# Patient Record
Sex: FEMALE | Race: AMERICAN INDIAN OR ALASKA NATIVE | NOT HISPANIC OR LATINO | Employment: FULL TIME | URBAN - METROPOLITAN AREA
[De-identification: names, ages, dates, MRNs, and addresses within clinical notes are randomized per-mention and may not be internally consistent; named-entity substitution may affect disease eponyms.]

---
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
## 2020-01-07 ENCOUNTER — TRANSFERRED RECORDS (OUTPATIENT)
Dept: HEALTH INFORMATION MANAGEMENT | Facility: CLINIC | Age: 20
End: 2020-01-07

## 2020-05-18 ENCOUNTER — TELEPHONE (OUTPATIENT)
Dept: FAMILY MEDICINE | Facility: OTHER | Age: 20
End: 2020-05-18

## 2020-05-18 NOTE — TELEPHONE ENCOUNTER
Patient is due for a PHQ-9.  Index start date:1/25/2020  Index end date:5/24/2020    Please call patient. Pt is also due for a phone visit/E-visit for a med recheck.

## 2020-05-20 ENCOUNTER — TELEPHONE (OUTPATIENT)
Dept: FAMILY MEDICINE | Facility: OTHER | Age: 20
End: 2020-05-20

## 2020-05-20 ASSESSMENT — PATIENT HEALTH QUESTIONNAIRE - PHQ9: SUM OF ALL RESPONSES TO PHQ QUESTIONS 1-9: 0

## 2020-05-20 NOTE — TELEPHONE ENCOUNTER
Tried contacting patient. Left message to call back. Please relay information upon return.   Kika Santa CMA

## 2020-05-20 NOTE — TELEPHONE ENCOUNTER
Pt saw Dr. Thapa in October 2019 for hot flashes. Pt states she works at [employer] and they are requiring them to wear masks unless they have a doctor's note. She is requesting a note that she cannot wear a mask. She states she gets hot flashes with it on and can't focus on her job. Please call pt and she will  the letter. Maria Elena Garrett CMA ([facility])

## 2020-05-20 NOTE — TELEPHONE ENCOUNTER
Pt completed PHQ-9.    PHQ 5/20/2020   PHQ-9 Total Score 0   Q9: Thoughts of better off dead/self-harm past 2 weeks Not at all     Marai Elena Garrett CMA ([facility])

## 2020-05-20 NOTE — TELEPHONE ENCOUNTER
It is not appropriate for me to excuse her for not wearing mask for public safety, especially with her job setting.  She is too young to have hot flashes, need to be evaluate for it.  Thanks.

## 2020-05-21 NOTE — TELEPHONE ENCOUNTER
Patient was informed that It is not appropriate for Dr. Thapa to excuse you for not wearing mask for public safety, especially with your job setting.  You are too young to have hot flashes, need to be evaluate for it.    Eagle Arango, CMA

## 2021-10-19 PROBLEM — F32.9 MAJOR DEPRESSION: Status: ACTIVE | Noted: 2018-08-22
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
## 2023-06-20 ENCOUNTER — OFFICE VISIT (OUTPATIENT)
Dept: FAMILY MEDICINE | Facility: CLINIC | Age: 23
End: 2023-06-20
Payer: COMMERCIAL

## 2023-06-20 VITALS
OXYGEN SATURATION: 98 % | DIASTOLIC BLOOD PRESSURE: 71 MMHG | HEIGHT: 67 IN | HEART RATE: 103 BPM | RESPIRATION RATE: 10 BRPM | SYSTOLIC BLOOD PRESSURE: 108 MMHG | BODY MASS INDEX: 22.73 KG/M2 | WEIGHT: 144.8 LBS
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
## 2023-06-20 DIAGNOSIS — M54.50 CHRONIC BILATERAL LOW BACK PAIN WITHOUT SCIATICA: ICD-10-CM

## 2023-06-20 DIAGNOSIS — G89.29 CHRONIC BILATERAL LOW BACK PAIN WITHOUT SCIATICA: ICD-10-CM

## 2023-06-20 DIAGNOSIS — M70.61 TROCHANTERIC BURSITIS OF RIGHT HIP: ICD-10-CM

## 2023-06-20 DIAGNOSIS — M76.31 IT BAND SYNDROME, RIGHT: ICD-10-CM

## 2023-06-20 PROCEDURE — 99203 OFFICE O/P NEW LOW 30 MIN: CPT | Performed by: FAMILY MEDICINE

## 2023-06-20 ASSESSMENT — PAIN SCALES - GENERAL: PAINLEVEL: MODERATE PAIN (5)

## 2023-06-20 ASSESSMENT — PATIENT HEALTH QUESTIONNAIRE - PHQ9
SUM OF ALL RESPONSES TO PHQ QUESTIONS 1-9: 0
10. IF YOU CHECKED OFF ANY PROBLEMS, HOW DIFFICULT HAVE THESE PROBLEMS MADE IT FOR YOU TO DO YOUR WORK, TAKE CARE OF THINGS AT HOME, OR GET ALONG WITH OTHER PEOPLE: NOT DIFFICULT AT ALL
SUM OF ALL RESPONSES TO PHQ QUESTIONS 1-9: 0

## 2023-06-20 ASSESSMENT — ANXIETY QUESTIONNAIRES
GAD7 TOTAL SCORE: 0
5. BEING SO RESTLESS THAT IT IS HARD TO SIT STILL: NOT AT ALL
GAD7 TOTAL SCORE: 0
3. WORRYING TOO MUCH ABOUT DIFFERENT THINGS: NOT AT ALL
1. FEELING NERVOUS, ANXIOUS, OR ON EDGE: NOT AT ALL
7. FEELING AFRAID AS IF SOMETHING AWFUL MIGHT HAPPEN: NOT AT ALL
7. FEELING AFRAID AS IF SOMETHING AWFUL MIGHT HAPPEN: NOT AT ALL
2. NOT BEING ABLE TO STOP OR CONTROL WORRYING: NOT AT ALL
GAD7 TOTAL SCORE: 0
6. BECOMING EASILY ANNOYED OR IRRITABLE: NOT AT ALL
4. TROUBLE RELAXING: NOT AT ALL
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
## 2023-09-19 ENCOUNTER — TELEPHONE (OUTPATIENT)
Dept: FAMILY MEDICINE | Facility: CLINIC | Age: 23
End: 2023-09-19

## 2023-09-19 DIAGNOSIS — M70.61 TROCHANTERIC BURSITIS OF RIGHT HIP: ICD-10-CM
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
## 2023-09-19 DIAGNOSIS — M76.31 IT BAND SYNDROME, RIGHT: ICD-10-CM

## 2023-09-19 DIAGNOSIS — M54.50 CHRONIC BILATERAL LOW BACK PAIN WITHOUT SCIATICA: ICD-10-CM

## 2023-09-19 DIAGNOSIS — G89.29 CHRONIC BILATERAL LOW BACK PAIN WITHOUT SCIATICA: ICD-10-CM
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
## 2023-09-21 NOTE — TELEPHONE ENCOUNTER
Referrals for physical therapy and chiropractic have been faxed to [facility] as requested by patient. Argelia Thomas LPN

## 2023-09-21 NOTE — TELEPHONE ENCOUNTER
Orders signed.  Sending back to team to follow-up on notification and/or subsequent scheduling.    Bertin Caceres MD

## 2024-04-04 ENCOUNTER — TELEPHONE (OUTPATIENT)
Dept: FAMILY MEDICINE | Facility: CLINIC | Age: 24
End: 2024-04-04
Payer: COMMERCIAL

## 2024-04-04 NOTE — TELEPHONE ENCOUNTER
Reason for Call:  Appointment Request    Patient requesting this type of appt: Procedure: IUD replacement    Requested provider:  PCP    Reason patient unable to be scheduled: whenever open    When does patient want to be seen/preferred time:  ASAP    Comments: Pt will need sedation for this replacement    Okay to leave a detailed message?: Yes at Cell number on file:    Telephone Information:   Mobile [phone]

## 2024-04-05 NOTE — TELEPHONE ENCOUNTER
Mirena IUDs now can be left in place for 8 years for birth control.    Will have staff notify patient. She has appointment in August and we can discuss this more then.    Bertin Caceres MD

## 2024-08-27 ENCOUNTER — OFFICE VISIT (OUTPATIENT)
Dept: FAMILY MEDICINE | Facility: CLINIC | Age: 24
End: 2024-08-27
Payer: COMMERCIAL

## 2024-08-27 VITALS
SYSTOLIC BLOOD PRESSURE: 110 MMHG | WEIGHT: 144 LBS | TEMPERATURE: 97.1 F | HEART RATE: 82 BPM | HEIGHT: 68 IN | BODY MASS INDEX: 21.82 KG/M2 | DIASTOLIC BLOOD PRESSURE: 74 MMHG | RESPIRATION RATE: 16 BRPM

## 2024-08-27 DIAGNOSIS — F41.1 GAD (GENERALIZED ANXIETY DISORDER): ICD-10-CM

## 2024-08-27 DIAGNOSIS — Z11.59 NEED FOR HEPATITIS C SCREENING TEST: ICD-10-CM

## 2024-08-27 DIAGNOSIS — Z11.3 SCREEN FOR STD (SEXUALLY TRANSMITTED DISEASE): ICD-10-CM

## 2024-08-27 DIAGNOSIS — Z00.00 ROUTINE GENERAL MEDICAL EXAMINATION AT A HEALTH CARE FACILITY: Primary | ICD-10-CM

## 2024-08-27 DIAGNOSIS — N94.6 DYSMENORRHEA: ICD-10-CM

## 2024-08-27 PROBLEM — Z87.820 HX OF TRAUMATIC BRAIN INJURY: Status: RESOLVED | Noted: 2017-08-28 | Resolved: 2024-08-27

## 2024-08-27 PROBLEM — Z86.79 PERSONAL HISTORY OF SUBARACHNOID HEMORRHAGE: Status: RESOLVED | Noted: 2019-10-28 | Resolved: 2024-08-27

## 2024-08-27 PROBLEM — S06.9X9A TRAUMATIC BRAIN INJURY WITH LOSS OF CONSCIOUSNESS (H): Status: RESOLVED | Noted: 2019-10-28 | Resolved: 2024-08-27

## 2024-08-27 PROBLEM — Z97.5 IUD (INTRAUTERINE DEVICE) IN PLACE: Status: ACTIVE | Noted: 2019-04-04

## 2024-08-27 PROBLEM — F32.9 MAJOR DEPRESSION: Status: RESOLVED | Noted: 2018-08-22 | Resolved: 2024-08-27
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
## 2024-08-27 LAB — HCV AB SERPL QL IA: NONREACTIVE

## 2024-08-27 PROCEDURE — 86803 HEPATITIS C AB TEST: CPT | Performed by: FAMILY MEDICINE

## 2024-08-27 PROCEDURE — 87591 N.GONORRHOEAE DNA AMP PROB: CPT | Performed by: FAMILY MEDICINE

## 2024-08-27 PROCEDURE — 87491 CHLMYD TRACH DNA AMP PROBE: CPT | Performed by: FAMILY MEDICINE

## 2024-08-27 PROCEDURE — 36415 COLL VENOUS BLD VENIPUNCTURE: CPT | Performed by: FAMILY MEDICINE

## 2024-08-27 PROCEDURE — 99395 PREV VISIT EST AGE 18-39: CPT | Performed by: FAMILY MEDICINE

## 2024-08-27 ASSESSMENT — PATIENT HEALTH QUESTIONNAIRE - PHQ9
SUM OF ALL RESPONSES TO PHQ QUESTIONS 1-9: 1
10. IF YOU CHECKED OFF ANY PROBLEMS, HOW DIFFICULT HAVE THESE PROBLEMS MADE IT FOR YOU TO DO YOUR WORK, TAKE CARE OF THINGS AT HOME, OR GET ALONG WITH OTHER PEOPLE: NOT DIFFICULT AT ALL
SUM OF ALL RESPONSES TO PHQ QUESTIONS 1-9: 1

## 2024-08-27 ASSESSMENT — PAIN SCALES - GENERAL: PAINLEVEL: NO PAIN (0)

## 2024-08-27 NOTE — LETTER
September 20, 2024      Yamileth Nichols  [address] [place] [id number]        Dear ,    We are writing to inform you of your test results.    Your test results fall within the expected range(s) or remain unchanged from previous results.  Please continue with current treatment plan.    Resulted Orders   Hepatitis C Screen Reflex to HCV RNA Quant and Genotype   Result Value Ref Range    Hepatitis C Antibody Nonreactive Nonreactive      Comment:      A nonreactive screening test result does not exclude the possibility of exposure to or infection with HCV. Nonreactive screening test results in individuals with prior exposure to HCV may be due to antibody levels below the limit of detection of this assay or lack of reactivity to the HCV antigens used in this assay. Patients with recent HCV infections (<3 months from time of exposure) may have false-negative HCV antibody results due to the time needed for seroconversion (average of 8 to 9 weeks).   CHLAMYDIA TRACHOMATIS PCR   Result Value Ref Range    Chlamydia trachomatis Negative Negative      Comment:      A negative result by transcription mediated amplification does not preclude the presence of C. trachomatis infection because results are dependent on proper and adequate collection, absence of inhibitors and sufficient rRNA to be detected.   NEISSERIA GONORRHOEA PCR   Result Value Ref Range    Neisseria gonorrhoeae Negative Negative      Comment:      Negative for N. gonorrhoeae rRNA by transcription mediated amplification. A negative result by transcription mediated amplification does not preclude the presence of C. trachomatis infection because results are dependent on proper and adequate collection, absence of inhibitors and sufficient rRNA to be detected.       If you have any questions or concerns, please call the clinic at the number listed above.       Sincerely,      Bertin Caceres MD
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
## 2024-08-27 NOTE — PATIENT INSTRUCTIONS
Patient Education   Preventive Care Advice   This is general advice given by our system to help you stay healthy. However, your care team may have specific advice just for you. Please talk to your care team about your preventive care needs.  Nutrition  Eat 5 or more servings of fruits and vegetables each day.  Try wheat bread, brown rice and whole grain pasta (instead of white bread, rice, and pasta).  Get enough calcium and vitamin D. Check the label on foods and aim for 100% of the RDA (recommended daily allowance).  Lifestyle  Exercise at least 150 minutes each week  (30 minutes a day, 5 days a week).  Do muscle strengthening activities 2 days a week. These help control your weight and prevent disease.  No smoking.  Wear sunscreen to prevent skin cancer.  Have a dental exam and cleaning every 6 months.  Yearly exams  See your health care team every year to talk about:  Any changes in your health.  Any medicines your care team has prescribed.  Preventive care, family planning, and ways to prevent chronic diseases.  Shots (vaccines)   HPV shots (up to age 26), if you've never had them before.  Hepatitis B shots (up to age 59), if you've never had them before.  COVID-19 shot: Get this shot when it's due.  Flu shot: Get a flu shot every year.  Tetanus shot: Get a tetanus shot every 10 years.  Pneumococcal, hepatitis A, and RSV shots: Ask your care team if you need these based on your risk.  Shingles shot (for age 50 and up)  General health tests  Diabetes screening:  Starting at age 35, Get screened for diabetes at least every 3 years.  If you are younger than age 35, ask your care team if you should be screened for diabetes.  Cholesterol test: At age 39, start having a cholesterol test every 5 years, or more often if advised.  Bone density scan (DEXA): At age 50, ask your care team if you should have this scan for osteoporosis (brittle bones).  Hepatitis C: Get tested at least once in your life.  STIs (sexually  transmitted infections)  Before age 24: Ask your care team if you should be screened for STIs.  After age 24: Get screened for STIs if you're at risk. You are at risk for STIs (including HIV) if:  You are sexually active with more than one person.  You don't use condoms every time.  You or a partner was diagnosed with a sexually transmitted infection.  If you are at risk for HIV, ask about PrEP medicine to prevent HIV.  Get tested for HIV at least once in your life, whether you are at risk for HIV or not.  Cancer screening tests  Cervical cancer screening: If you have a cervix, begin getting regular cervical cancer screening tests starting at age 21.  Breast cancer scan (mammogram): If you've ever had breasts, begin having regular mammograms starting at age 40. This is a scan to check for breast cancer.  Colon cancer screening: It is important to start screening for colon cancer at age 45.  Have a colonoscopy test every 10 years (or more often if you're at risk) Or, ask your provider about stool tests like a FIT test every year or Cologuard test every 3 years.  To learn more about your testing options, visit:   .  For help making a decision, visit:   https://bit.ly/dv60961.  Prostate cancer screening test: If you have a prostate, ask your care team if a prostate cancer screening test (PSA) at age 55 is right for you.  Lung cancer screening: If you are a current or former smoker ages 50 to 80, ask your care team if ongoing lung cancer screenings are right for you.  For informational purposes only. Not to replace the advice of your health care provider. Copyright   2023 [employer]. All rights reserved. Clinically reviewed by the [facility] Transitions Program. [employer] 802238 - REV 01/24.

## 2024-08-28 LAB
C TRACH DNA SPEC QL NAA+PROBE: NEGATIVE
N GONORRHOEA DNA SPEC QL NAA+PROBE: NEGATIVE

## 2024-09-22 ENCOUNTER — TELEPHONE (OUTPATIENT)
Dept: FAMILY MEDICINE | Facility: CLINIC | Age: 24
End: 2024-09-22
Payer: COMMERCIAL

## 2024-09-22 NOTE — TELEPHONE ENCOUNTER
Please call patient and let her know that Dr. Thea Raygoza can do a telephone or video-based visit to discuss IUD placement with sedation/pain reduction methods.  After they do consultation, they can discuss timing of procedure and how to get it scheduled.    Bertin Caceres MD

## 2024-10-09 ENCOUNTER — VIRTUAL VISIT (OUTPATIENT)
Dept: FAMILY MEDICINE | Facility: CLINIC | Age: 24
End: 2024-10-09
Payer: COMMERCIAL

## 2024-10-09 ENCOUNTER — MYC MEDICAL ADVICE (OUTPATIENT)
Dept: FAMILY MEDICINE | Facility: CLINIC | Age: 24
End: 2024-10-09

## 2024-10-09 DIAGNOSIS — Z30.431 INTRAUTERINE DEVICE SURVEILLANCE: ICD-10-CM

## 2024-10-09 DIAGNOSIS — N94.6 DYSMENORRHEA: Primary | ICD-10-CM

## 2024-10-09 DIAGNOSIS — Z30.09 BIRTH CONTROL COUNSELING: ICD-10-CM

## 2024-10-09 PROCEDURE — 99214 OFFICE O/P EST MOD 30 MIN: CPT | Mod: 95 | Performed by: FAMILY MEDICINE

## 2024-10-09 NOTE — PROGRESS NOTES
Yamileth is a 23 year old who is being evaluated via a billable video visit.    How would you like to obtain your AVS? MyChart  If the video visit is dropped, the invitation should be resent by: Text to cell phone: [phone]  Will anyone else be joining your video visit? No      Assessment & Plan     (N94.6) Dysmenorrhea  (primary encounter diagnosis)  Comment: Patient with significant dysmenorrhea and heavy vaginal bleeding during her periods.  She is currently using IUD to help control her symptoms and for birth control.  Current device is 5 years old that she has noticed a significant increase in her vaginal bleeding and pain with her periods.  We discussed both pharmacologic and nonpharmacologic options for pain control and anxiety prevention during her procedure.  She will consider her options and message me back when she is ready to decide on the specifics that she would like to have.  She is amenable to scheduling the removal and replacement of her IUD for November    (Z30.431) Intrauterine device surveillance  Comment: Discussed Mirena IUD.  She is amenable to replacement of her current device in November    (Z30.09) Birth control counseling  Comment: Comprehensive birth control counseling was done with the patient.  At this juncture she would like to continue with her Mirena IUD.  She was offered but declined various other methods including Nexplanon as well as combined contraceptive use: Oral/topical/ring                Subjective   Yamileth is a 23 year old, presenting for the following health issues:  IUD        10/9/2024    10:52 AM   Additional Questions   Roomed by Jazmine Zavala Start Time:  1100    IUD    History of Present Illness       Reason for visit:  IUD consultation   She is taking medications regularly.     Is a 23-year-old female here for consult regarding IUD insertion.  She had a very difficult insertion on last IUD placement and was worried about replacing her current IUD.  She  currently uses Mirena IUD for control of heavy vaginal bleeding during her periods.  Her current device is approximately 5 years old but is lessening significantly in efficacy with significant uptake and bleeding over the last 6 months.  I was asked to consult as she had a very difficult placement when it was initially placed.  She had significant cramping and bleeding and then difficulties as the strings were cut too short and cause discomfort for her partner    Reviewed options for the actual procedure itself.  We can premedicate with Ativan as well as an opioid if she desires.  She does not desire an opioid at this time but we did discuss doing NSAID instead and patient was amenable to that.  We also discussed doing a paracervical block and patient was amenable to that.  She will be discussing her options with her mother as well.  We did discuss doing it in the procedure room here in clinic versus doing a procedure in the main OR with sedation.  She will consider her options        Objective    Vitals - Patient Reported  Pain Score: No Pain (0)        Physical Exam   GENERAL: alert and no distress  EYES: Eyes grossly normal to inspection.  No discharge or erythema, or obvious scleral/conjunctival abnormalities.  RESP: No audible wheeze, cough, or visible cyanosis.    SKIN: Visible skin clear. No significant rash, abnormal pigmentation or lesions.  NEURO: Cranial nerves grossly intact.  Mentation and speech appropriate for age.  PSYCH: Appropriate affect, tone, and pace of words    Office Visit on 08/27/2024   Component Date Value Ref Range Status    Hepatitis C Antibody 08/27/2024 Nonreactive  Nonreactive Final    A nonreactive screening test result does not exclude the possibility of exposure to or infection with HCV. Nonreactive screening test results in individuals with prior exposure to HCV may be due to antibody levels below the limit of detection of this assay or lack of reactivity to the HCV antigens used  in this assay. Patients with recent HCV infections (<3 months from time of exposure) may have false-negative HCV antibody results due to the time needed for seroconversion (average of 8 to 9 weeks).    Chlamydia trachomatis 08/27/2024 Negative  Negative Final    A negative result by transcription mediated amplification does not preclude the presence of C. trachomatis infection because results are dependent on proper and adequate collection, absence of inhibitors and sufficient rRNA to be detected.    Neisseria gonorrhoeae 08/27/2024 Negative  Negative Final    Negative for N. gonorrhoeae rRNA by transcription mediated amplification. A negative result by transcription mediated amplification does not preclude the presence of C. trachomatis infection because results are dependent on proper and adequate collection, absence of inhibitors and sufficient rRNA to be detected.         Video-Visit Details    Type of service:  Video Visit   Video End Time: 1130  Originating Location (pt. Location): Home    Distant Location (provider location):  On-site  Platform used for Video Visit: Tania  Signed Electronically by: Thea Raygoza MD

## 2024-10-09 NOTE — Clinical Note
Can we reach out to get this IUD placement scheduled in the procedure room? Pt requesting for November. Please note on appt that we will be doing a cervical block and Premedicate with ativan.

## 2024-10-11 ENCOUNTER — TELEPHONE (OUTPATIENT)
Dept: FAMILY MEDICINE | Facility: CLINIC | Age: 24
End: 2024-10-11
Payer: COMMERCIAL

## 2024-10-11 NOTE — TELEPHONE ENCOUNTER
Thea Raygoza MD  P [facility]      Can we reach out to get this IUD placement scheduled in the procedure room? Pt requesting for November. Please note on appt that we will be doing a cervical block and Premedicate with ativan.        [employer] message sent to patient to assist with scheduling. Reminder set up for 3 days to call patient if message is not read by then.

## 2024-10-14 ENCOUNTER — NURSE TRIAGE (OUTPATIENT)
Dept: FAMILY MEDICINE | Facility: CLINIC | Age: 24
End: 2024-10-14
Payer: COMMERCIAL

## 2024-10-14 NOTE — TELEPHONE ENCOUNTER
"Nurse Triage SBAR    Is this a 2nd Level Triage? YES, LICENSED PRACTITIONER REVIEW IS REQUIRED    Situation: Patient reports constant abdominal pain for the last week or so.     Background: Patient states she has an IUD that has been in place for 5 years now. She states her previous IUD was replaced at 5 years, but with this most recent one states she has been told it is good for 8 years. She states 4/4/24 was her 5 year clay. She states she typically would not get a period with her IUD, but since she got closer to the 5 year clay for her IUD she has experienced menstrual bleeding with no cycle, states it is random and can last for a day or even a couple hours.    Patient states she is in [place] right now, and will be returning home next week on Thursday or Friday.     Assessment: Patient states she had a virtual visit 10/9/24, and at that time had abdominal pain, but it was \"super faint\" so she did not mention it during the visit. She states it started a day or 2 prior, so her symptoms have been going on for a week now. She states the pain is on the right side of her abdomen, about 2\" from her belly button, and slightly lower. She states she has pain and a pressure sensation. She states her current pain level is 3-4/10, her pain has been constant, and the intensity varies depending on position. She states she has been having BM, does not feel constipated, but states if she has a BM it can relieve some of the pressure she feels in her abdomen. She states at times she gets \"electricity cramping\" in her abdomen that goes into her rectum.     Protocol Recommended Disposition:   Call ADS/Go to ED/UCC Now (Or To Office with PCP Approval)    Recommendation: Per protocol, patient was advised she should go to ED/UCC Now. She refused, states [place] of [place] now and does not have great insurance. She states if her pain gets worse she would go in to be seen, but otherwise would like to be seen in clinic after returning " home next week. Advised patient to call back or seek urgent/emergency care for any new or worsening symptoms. She verbalized understanding and had no other questions or concerns.       MSI Brewster, RN        Reason for Disposition   MILD TO MODERATE constant pain lasting > 2 hours    Additional Information   Negative: Passed out (i.e., fainted, collapsed and was not responding)   Negative: Shock suspected (e.g., cold/pale/clammy skin, too weak to stand, low BP, rapid pulse)   Negative: Sounds like a life-threatening emergency to the triager   Negative: Followed an abdomen (stomach) injury   Negative: Chest pain   Negative: Abdominal pain and pregnant < 20 weeks   Negative: Abdominal pain and pregnant 20 or more weeks   Negative: Pain is mainly in upper abdomen (if needed ask: 'is it mainly above the belly button?')   Negative: Abdomen bloating or swelling are main symptoms   Negative: SEVERE abdominal pain (e.g., excruciating)   Negative: Vomiting red blood or black (coffee ground) material   Negative: Blood in bowel movements  (Exception: Blood on surface of BM with constipation.)   Negative: Black or tarry bowel movements  (Exception: Chronic-unchanged black-grey BMs AND is taking iron pills or Pepto-Bismol.)    Protocols used: Abdominal Pain - Female-A-[place]

## 2024-10-14 NOTE — TELEPHONE ENCOUNTER
"I am not sure when she specifically is coming home, we could see her on 10/21 at 1 PM \"provider schedule time\"    If that does not work, we could also see her on Tuesday 10/29 at 1 PM \"provider schedule time\"    Will have staff notify patient and schedule.    Bertin Caceres MD  " breath sounds clear and equal bilaterally.

## 2024-10-14 NOTE — TELEPHONE ENCOUNTER
Patient notified of providers advice and she is unable to make it either day. Patient scheduled with another provider for a day that works for her.    Mai Bahena RN on 10/14/2024 at 1:11 PM

## 2024-10-15 NOTE — TELEPHONE ENCOUNTER
Patient has multiple myCharts and communications since MyChart. No further action needed.    Neal Damon RN on 10/15/2024 at 9:31 AM      Future Appointments 10/15/2024 - 4/13/2025        Date Visit Type Length Department Provider     10/28/2024  1:00 PM OFFICE VISIT 30 min  FAMILY PRACTICE Danica Jimenez NP    Location Instructions:     [facility] is located at [address], within [facility]. This is near the [address] exit off of [address]. Turn north off the exit, then west onto [address]. Park in the Blue Lot. The [facility] and [facility] share a main entrance and .               12/20/2024 11:00 AM IUD PLACEMENT OR REPLACEMENT 40 min  FAMILY PRACTICE Thea Raygoza MD     12/20/2024 11:00 AM IUD PLACEMENT OR REPLACEMENT 40 min  FAMILY PRACTICE [facility]    Location Instructions:     [facility] is located at [address], within [facility]. This is near the [address] exit off of [address]. Turn north off the exit, then west onto [address]. Park in the Blue Lot. The [facility] and [facility] share a main entrance and .

## 2024-10-28 ENCOUNTER — OFFICE VISIT (OUTPATIENT)
Dept: FAMILY MEDICINE | Facility: CLINIC | Age: 24
End: 2024-10-28
Payer: COMMERCIAL

## 2024-10-28 ENCOUNTER — MYC MEDICAL ADVICE (OUTPATIENT)
Dept: FAMILY MEDICINE | Facility: CLINIC | Age: 24
End: 2024-10-28

## 2024-10-28 VITALS
RESPIRATION RATE: 16 BRPM | HEIGHT: 69 IN | HEART RATE: 82 BPM | SYSTOLIC BLOOD PRESSURE: 108 MMHG | DIASTOLIC BLOOD PRESSURE: 72 MMHG | OXYGEN SATURATION: 98 % | TEMPERATURE: 98.5 F | WEIGHT: 149.2 LBS | BODY MASS INDEX: 22.1 KG/M2

## 2024-10-28 DIAGNOSIS — R10.31 RLQ ABDOMINAL PAIN: Primary | ICD-10-CM

## 2024-10-28 PROCEDURE — 99213 OFFICE O/P EST LOW 20 MIN: CPT

## 2024-10-28 ASSESSMENT — PAIN SCALES - GENERAL: PAINLEVEL_OUTOF10: NO PAIN (0)

## 2024-10-28 NOTE — PROGRESS NOTES
Assessment & Plan     RLQ abdominal pain  - US Abdomen Complete; Future        I spent a total of 10 minutes on the day of the visit.   Time spent by me doing chart review, history and exam, documentation and further activities per the note      See Patient Instructions  Patient Instructions   Assessment  - Differential diagnosis includes appendicitis, ovarian cyst rupture, or menstrual-related cramping.  - Previous history of a right ovarian cyst noted; possible rupture considered.  - Ultrasound recommended to rule out appendicitis and assess for ovarian cysts.    Plan  - Tylenol as needed  Schedule an abdominal ultrasound to evaluate for appendicitis or ovarian cyst.  - Monitor for worsening pain and return for evaluation if symptoms intensify.  - Review provided information on abdominal pain and follow guidelines for when to seek medical attention.  - Check [employer] account for ultrasound results once available.      Call US to schedule    Treatment for abdominal pain depends on the underlying cause and severity of the pain. Here s a general overview of approaches to managing abdominal pain:    ### 1. **Medical Evaluation and Diagnosis**    - **History and Physical Examination**: Detailed assessment to determine the location, duration, and characteristics of the pain.  - **Diagnostic Tests**: Depending on suspected causes, these may include blood tests, imaging (such as ultrasound, CT scan, or MRI), endoscopy, or other specialized tests.    ### 2. **Treatment Options**    #### **Based on Cause**    - **Gastrointestinal Conditions**: Treatment may include medications, dietary adjustments, and lifestyle changes.    - **Acid Reflux or GERD**: Antacids, H2 blockers (e.g., ranitidine), or proton pump inhibitors (e.g., omeprazole).    - **Peptic Ulcer Disease**: Antibiotics to eradicate H. pylori infection, acid-suppressing medications.    - **Inflammatory Bowel Disease**: Anti-inflammatory medications,  immune-modulating drugs, and dietary changes.    - **Gallstones**: Pain management, medications to dissolve stones, or surgical removal (cholecystectomy).    - **Pancreatitis**: Hospitalization, pain control, IV fluids, and management of underlying causes (e.g., alcohol cessation).    - **IBS (Irritable Bowel Syndrome)**: Dietary modifications, stress management, and medications to relieve symptoms.  - **Gynecological Conditions**: Treatment options may include medications, hormonal therapy, or surgical interventions for conditions like ovarian cysts, endometriosis, or fibroids.  - **Urinary Tract Conditions**: Antibiotics for urinary tract infections, pain management for kidney stones, or surgical interventions if needed.    #### **Pain Management**    - **Analgesics**: Over-the-counter pain relievers such as acetaminophen or NSAIDs (like ibuprofen) may help alleviate mild to moderate pain.  - **Prescription Pain Medications**: Stronger pain relievers may be prescribed for severe pain under medical supervision.  - **Antispasmodic Medications**: For conditions causing muscle spasms in the gastrointestinal tract.    ### 3. **Lifestyle and Home Remedies**    - **Dietary Changes**: Avoiding trigger foods, increasing fiber intake for constipation, and maintaining a balanced diet.  - **Hydration**: Drinking plenty of fluids to prevent dehydration and support digestive health.  - **Stress Management**: Techniques such as relaxation exercises or cognitive behavioral therapy may help manage stress-related abdominal pain.    ### 4. **Surgical Interventions**    - **Emergency Situations**: Surgery may be necessary for conditions like appendicitis, bowel obstruction, or perforated ulcers.    ### 5. **Follow-Up and Monitoring**    - **Regular Monitoring**: Especially important for chronic conditions or following surgical interventions to ensure healing and manage ongoing symptoms.  - **Medication Adjustments**: Monitoring for  side effects and adjusting medications as needed.    ### 6. **Specialist Consultation**    - **Referral**: Depending on the suspected cause of abdominal pain, consultation with a gastroenterologist, gynecologist, urologist, or other specialists may be necessary for specialized evaluation and management.    ### Summary    The treatment of abdominal pain is tailored to the underlying cause, which requires a thorough evaluation by a healthcare provider. Effective management often involves a combination of medications, lifestyle changes, and sometimes surgical interventions. Prompt medical attention is crucial, especially for severe or persistent abdominal pain, to diagnose the cause and initiate appropriate treatment to alleviate symptoms and prevent complications.     [place]    Patient understood and verbally consented to the treatment plan. Discussed symptoms that would warrant an urgent or emergent visit. All of the patients' questions were answered. Patient was instructed to contact the clinic if questions or concerns arise. Recommend follow up appointments if symptoms worsen or fail to improve. Recommend follow up as needed. Recommend ER in the case of an emergency.    Lina Schroeder PA-C    Please note: Voice recognition software may have been used in preparing this note, unintended word substitutions may be present.      Warren Carson is a 23 year old, presenting for the following health issues:  Abdominal Pain        10/28/2024     2:23 PM   Additional Questions   Roomed by Jesenia     History of Present Illness       Reason for visit:  Pain in abdomen  Symptom onset:  3-4 weeks ago  Symptom intensity:  Severe  Symptom progression:  Improving  Had these symptoms before:  No   She is taking medications regularly.     Abdominal pain, she almost went to the ER but was unsure if it would be covred. October 19th 1am to 5am. Sever abdominal pain. Right lower side. Started getting less severe. No pain left  side. Nausea, no vomiting. No pain right now.     Subjective  The patient reports returning from vacation and experiencing severe abdominal pain on October 19th, from 1:00 AM to 5:00 AM, located on the right lower side. The pain has since decreased. An at-home appendix check was negative. The patient experienced nausea but no vomiting. There is a history of an ovarian cyst on the right side, identified in 2019 via ultrasound, which was described as 4 cm. The patient suspects the pain might be related to the Mirena IUD, which is due for replacement after eight years, as it was initially intended for five years. The patient reports not usually getting periods but experienced one a few days after the pain, along with severe symptoms like skin breakouts. The patient prefers the Mirena IUD over other forms of birth control, as pills are not well-tolerated. There is a family history of adverse reactions to birth control, as the patient's mother experienced severe pain from an implanted device. The patient lives in [facility] but frequently visits the area where the appointment took place.    Objective  - Physical exam:  - Tenderness on the right lower side of the abdomen    - Test results:  - At-home appendix check: negative  - Previous pelvic ultrasound (2019): right ovarian cyst, 4 cm    Assessment  - Differential diagnosis includes appendicitis, ovarian cyst rupture, or menstrual-related cramping.  - Previous history of a right ovarian cyst noted; possible rupture considered.  - Ultrasound recommended to rule out appendicitis and assess for ovarian cysts.    Plan  - Schedule an abdominal ultrasound to evaluate for appendicitis or ovarian cyst.  - Monitor for worsening pain and return for evaluation if symptoms intensify.  - Review provided information on abdominal pain and follow guidelines for when to seek medical attention.  - Check [employer] account for ultrasound results once available.                   "  Objective    /72 (BP Location: Right arm, Patient Position: Sitting, Cuff Size: Adult Regular)   Pulse 82   Temp 98.5  F (36.9  C) (Temporal)   Resp 16   Ht 1.75 m (5' 8.9\")   Wt 67.7 kg (149 lb 3.2 oz)   SpO2 98%   BMI 22.10 kg/m    Body mass index is 22.1 kg/m .  Physical Exam   GENERAL: alert and no distress  EYES: Eyes grossly normal to inspection, PERRL and conjunctivae and sclerae normal  ABDOMEN: soft, nontender, no hepatosplenomegaly, no masses and bowel sounds normal  SKIN: no suspicious lesions or rashes  NEURO: Normal strength and tone, mentation intact and speech normal  PSYCH: mentation appears normal, affect normal/bright    Office Visit on 08/27/2024   Component Date Value Ref Range Status    Hepatitis C Antibody 08/27/2024 Nonreactive  Nonreactive Final    A nonreactive screening test result does not exclude the possibility of exposure to or infection with HCV. Nonreactive screening test results in individuals with prior exposure to HCV may be due to antibody levels below the limit of detection of this assay or lack of reactivity to the HCV antigens used in this assay. Patients with recent HCV infections (<3 months from time of exposure) may have false-negative HCV antibody results due to the time needed for seroconversion (average of 8 to 9 weeks).    Chlamydia trachomatis 08/27/2024 Negative  Negative Final    A negative result by transcription mediated amplification does not preclude the presence of C. trachomatis infection because results are dependent on proper and adequate collection, absence of inhibitors and sufficient rRNA to be detected.    Neisseria gonorrhoeae 08/27/2024 Negative  Negative Final    Negative for N. gonorrhoeae rRNA by transcription mediated amplification. A negative result by transcription mediated amplification does not preclude the presence of C. trachomatis infection because results are dependent on proper and adequate collection, absence of inhibitors " and sufficient rRNA to be detected.     No results found for any visits on 10/28/24.  No results found.        Signed Electronically by: Lina Schroeder PA-C

## 2024-10-28 NOTE — PATIENT INSTRUCTIONS
Assessment  - Differential diagnosis includes appendicitis, ovarian cyst rupture, or menstrual-related cramping.  - Previous history of a right ovarian cyst noted; possible rupture considered.  - Ultrasound recommended to rule out appendicitis and assess for ovarian cysts.    Plan  - Tylenol as needed  Schedule an abdominal ultrasound to evaluate for appendicitis or ovarian cyst.  - Monitor for worsening pain and return for evaluation if symptoms intensify.  - Review provided information on abdominal pain and follow guidelines for when to seek medical attention.  - Check My[facility] account for ultrasound results once available.      Call US to schedule    Treatment for abdominal pain depends on the underlying cause and severity of the pain. Here s a general overview of approaches to managing abdominal pain:    ### 1. **Medical Evaluation and Diagnosis**    - **History and Physical Examination**: Detailed assessment to determine the location, duration, and characteristics of the pain.  - **Diagnostic Tests**: Depending on suspected causes, these may include blood tests, imaging (such as ultrasound, CT scan, or MRI), endoscopy, or other specialized tests.    ### 2. **Treatment Options**    #### **Based on Cause**    - **Gastrointestinal Conditions**: Treatment may include medications, dietary adjustments, and lifestyle changes.    - **Acid Reflux or GERD**: Antacids, H2 blockers (e.g., ranitidine), or proton pump inhibitors (e.g., omeprazole).    - **Peptic Ulcer Disease**: Antibiotics to eradicate H. pylori infection, acid-suppressing medications.    - **Inflammatory Bowel Disease**: Anti-inflammatory medications, immune-modulating drugs, and dietary changes.    - **Gallstones**: Pain management, medications to dissolve stones, or surgical removal (cholecystectomy).    - **Pancreatitis**: Hospitalization, pain control, IV fluids, and management of underlying causes (e.g., alcohol cessation).    - **IBS (Irritable Bowel  Syndrome)**: Dietary modifications, stress management, and medications to relieve symptoms.  - **Gynecological Conditions**: Treatment options may include medications, hormonal therapy, or surgical interventions for conditions like ovarian cysts, endometriosis, or fibroids.  - **Urinary Tract Conditions**: Antibiotics for urinary tract infections, pain management for kidney stones, or surgical interventions if needed.    #### **Pain Management**    - **Analgesics**: Over-the-counter pain relievers such as acetaminophen or NSAIDs (like ibuprofen) may help alleviate mild to moderate pain.  - **Prescription Pain Medications**: Stronger pain relievers may be prescribed for severe pain under medical supervision.  - **Antispasmodic Medications**: For conditions causing muscle spasms in the gastrointestinal tract.    ### 3. **Lifestyle and Home Remedies**    - **Dietary Changes**: Avoiding trigger foods, increasing fiber intake for constipation, and maintaining a balanced diet.  - **Hydration**: Drinking plenty of fluids to prevent dehydration and support digestive health.  - **Stress Management**: Techniques such as relaxation exercises or cognitive behavioral therapy may help manage stress-related abdominal pain.    ### 4. **Surgical Interventions**    - **Emergency Situations**: Surgery may be necessary for conditions like appendicitis, bowel obstruction, or perforated ulcers.    ### 5. **Follow-Up and Monitoring**    - **Regular Monitoring**: Especially important for chronic conditions or following surgical interventions to ensure healing and manage ongoing symptoms.  - **Medication Adjustments**: Monitoring for side effects and adjusting medications as needed.    ### 6. **Specialist Consultation**    - **Referral**: Depending on the suspected cause of abdominal pain, consultation with a gastroenterologist, gynecologist, urologist, or other specialists may be necessary for specialized evaluation and management.    ###  Summary    The treatment of abdominal pain is tailored to the underlying cause, which requires a thorough evaluation by a healthcare provider. Effective management often involves a combination of medications, lifestyle changes, and sometimes surgical interventions. Prompt medical attention is crucial, especially for severe or persistent abdominal pain, to diagnose the cause and initiate appropriate treatment to alleviate symptoms and prevent complications.     [employer]    Patient understood and verbally consented to the treatment plan. Discussed symptoms that would warrant an urgent or emergent visit. All of the patients' questions were answered. Patient was instructed to contact the clinic if questions or concerns arise. Recommend follow up appointments if symptoms worsen or fail to improve. Recommend follow up as needed. Recommend ER in the case of an emergency.    Lina Schroeder PA-C    Please note: Voice recognition software may have been used in preparing this note, unintended word substitutions may be present.

## 2024-10-29 ENCOUNTER — HOSPITAL ENCOUNTER (OUTPATIENT)
Dept: ULTRASOUND IMAGING | Facility: CLINIC | Age: 24
Discharge: HOME | End: 2024-10-29
Payer: COMMERCIAL

## 2024-10-29 DIAGNOSIS — R10.31 RLQ ABDOMINAL PAIN: ICD-10-CM

## 2024-10-29 PROCEDURE — 76856 US EXAM PELVIC COMPLETE: CPT

## 2024-10-31 NOTE — RESULT ENCOUNTER NOTE
Hello -    Here are my comments about the recent results. IMPRESSION:  1.  Normal pelvic ultrasound. IUD in satisfactory position.      Please let us know if you have any questions or concerns.    Regards,  Lina Schroeder PA-C

## 2024-11-13 ENCOUNTER — TELEPHONE (OUTPATIENT)
Dept: FAMILY MEDICINE | Facility: CLINIC | Age: 24
End: 2024-11-13

## 2024-11-13 NOTE — TELEPHONE ENCOUNTER
Patient Quality Outreach    Patient is due for the following:   Cervical Cancer Screening - PAP Needed  Physical Preventive Adult Physical    Action(s) Taken:   Schedule a Adult Preventative    Type of outreach:    Sent [employer] Xueyuan message.    Questions for provider review:    None           Natalie Orta MA

## 2024-12-17 NOTE — TELEPHONE ENCOUNTER
Patient Quality Outreach    Patient is due for the following:   Physical Preventive Adult Physical    Action(s) Taken:   Patient has upcoming appointment, these items will be addressed at that time.    Type of outreach:    Chart review performed, no outreach needed.    Questions for provider review:    None           Natalie Orta MA

## (undated) DEVICE — PACK MINOR PROCEDURE CUSTOM

## (undated) DEVICE — SPONGE COTTONOID 1/2X3" 80-1407

## (undated) RX ORDER — PROPOFOL 10 MG/ML
INJECTION, EMULSION INTRAVENOUS
Status: DISPENSED
Start: 2018-06-19

## (undated) RX ORDER — OXYMETAZOLINE HYDROCHLORIDE 0.05 G/100ML
SPRAY NASAL
Status: DISPENSED
Start: 2018-06-19

## (undated) RX ORDER — ONDANSETRON 2 MG/ML
INJECTION INTRAMUSCULAR; INTRAVENOUS
Status: DISPENSED
Start: 2018-06-19

## (undated) RX ORDER — DEXAMETHASONE SODIUM PHOSPHATE 10 MG/ML
INJECTION INTRAMUSCULAR; INTRAVENOUS
Status: DISPENSED
Start: 2018-06-19

## (undated) RX ORDER — FENTANYL CITRATE 50 UG/ML
INJECTION, SOLUTION INTRAMUSCULAR; INTRAVENOUS
Status: DISPENSED
Start: 2018-06-19

## (undated) RX ORDER — EPINEPHRINE 1 MG/ML
INJECTION, SOLUTION, CONCENTRATE INTRAVENOUS
Status: DISPENSED
Start: 2018-06-19

## (undated) RX ORDER — LIDOCAINE HYDROCHLORIDE 20 MG/ML
INJECTION, SOLUTION EPIDURAL; INFILTRATION; INTRACAUDAL; PERINEURAL
Status: DISPENSED
Start: 2018-06-19